# Patient Record
Sex: FEMALE | URBAN - METROPOLITAN AREA
[De-identification: names, ages, dates, MRNs, and addresses within clinical notes are randomized per-mention and may not be internally consistent; named-entity substitution may affect disease eponyms.]

---

## 2017-11-07 ENCOUNTER — TRANSFERRED RECORDS (OUTPATIENT)
Dept: HEALTH INFORMATION MANAGEMENT | Facility: CLINIC | Age: 72
End: 2017-11-07

## 2018-01-10 NOTE — TELEPHONE ENCOUNTER
APPT INFO    Date /Time: 1/17/18 12:45PM   Reason for Appt: Osteomyelitis, L tibia   Ref Provider/Clinic: MANOHAR GAINES/ [facility]    Are there internal records? Yes/No?  IF YES, list clinic names: NO   Are there outside records? Yes/No? YES - see below   Patient Contact (Y/N) & Call Details: NO   Action: CareEverywhere records reviewed. See CareEverywhere Tab.       OUTSIDE RECORDS CHECKLIST     CLINIC NAME COMMENTS REC (x) IMG (x)   [facility] OFFICE NOTES: 10/30/17, 9/11/17, 7/12/17, 4/12/17  RADIOLOGY: MR left tib fib 11/7/17, xray left tib fib 10/30/17 x X

## 2018-01-15 ASSESSMENT — ENCOUNTER SYMPTOMS
MYALGIAS: 0
ARTHRALGIAS: 1
STIFFNESS: 0
MUSCLE CRAMPS: 0
BACK PAIN: 0
MUSCLE WEAKNESS: 0
BRUISES/BLEEDS EASILY: 0
SWOLLEN GLANDS: 0
JOINT SWELLING: 1
NECK PAIN: 0

## 2018-01-16 NOTE — TELEPHONE ENCOUNTER
Phone Call:    Who did you talk to? (or) Who did you call? Patient transferred from [facility]    Call Detail/Action: Check if records sent, advised only need images      Phone Call:    Who did you talk to? (or) Who did you call? [facility] [place] film room- Claude    Call Detail/Action: He stated he received a confirmation of images from our film room      Phone Call:    Who did you talk to? (or) Who did you call? Called Raúl    Call Detail/Action: Images are in PACS under name GENO Jaquez Evelyn

## 2018-01-17 ENCOUNTER — PRE VISIT (OUTPATIENT)
Dept: ORTHOPEDICS | Facility: CLINIC | Age: 73
End: 2018-01-17

## 2018-01-17 ENCOUNTER — OFFICE VISIT (OUTPATIENT)
Dept: ORTHOPEDICS | Facility: CLINIC | Age: 73
End: 2018-01-17
Payer: COMMERCIAL

## 2018-01-17 VITALS — WEIGHT: 214.9 LBS | BODY MASS INDEX: 38.08 KG/M2 | HEIGHT: 63 IN

## 2018-01-17 DIAGNOSIS — M86.60 CHRONIC OSTEOMYELITIS (H): Primary | ICD-10-CM

## 2018-01-17 DIAGNOSIS — M86.9 OSTEOMYELITIS (H): Primary | ICD-10-CM

## 2018-01-17 DIAGNOSIS — M86.60 CHRONIC OSTEOMYELITIS (H): ICD-10-CM

## 2018-01-17 RX ORDER — CITALOPRAM HYDROBROMIDE 20 MG/1
10 TABLET ORAL
COMMUNITY
Start: 2017-12-18 | End: 2018-01-26

## 2018-01-17 RX ORDER — FUROSEMIDE 20 MG
20-40 TABLET ORAL
COMMUNITY
Start: 2017-12-18

## 2018-01-17 RX ORDER — CEPHALEXIN 500 MG/1
500 CAPSULE ORAL
COMMUNITY
Start: 2016-07-13 | End: 2018-01-17

## 2018-01-17 RX ORDER — SIMVASTATIN 10 MG
10 TABLET ORAL
COMMUNITY
Start: 2016-12-12

## 2018-01-17 RX ORDER — SILVER SULFADIAZINE 10 MG/G
0.5 CREAM TOPICAL
COMMUNITY
Start: 2017-09-11 | End: 2018-01-26

## 2018-01-17 RX ORDER — NAPROXEN SODIUM 550 MG/1
550 TABLET ORAL
COMMUNITY
Start: 2017-12-18 | End: 2018-12-18
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
## 2018-01-17 NOTE — MR AVS SNAPSHOT
After Visit Summary   1/17/2018    GENO Jaquez    MRN: [id number]           Patient Information     Date Of Birth          1945        Visit Information        Provider Department      1/17/2018 12:45 PM Fredis Mckeon MD [facility]         Follow-ups after your visit        Your next 10 appointments already scheduled     Jan 26, 2018  2:20 PM CST   CT TIBIA FIBULA LEFT W/O CONTRAST with UCCT1   [facility] ([facility])    [address] [place] [address] [phone]           Please bring any scans or X-rays taken at other hospitals, if similar tests were done. Also bring a list of your medicines, including vitamins, minerals and over-the-counter drugs. It is safest to leave personal items at home.  Be sure to tell your doctor:   If you have any allergies.   If there s any chance you are pregnant.   If you are breastfeeding.   If you have any special needs.  You do not need to do anything special to prepare.  Please wear loose clothing, such as a sweat suit or jogging clothes. Avoid snaps, zippers and other metal. We may ask you to undress and put on a hospital gown.            Jan 26, 2018  3:15 PM CST   (Arrive by 3:00 PM)   RETURN TUMOR VISIT with Fredis Mckeon MD   [facility] ([facility])    [address] [place] [address] [phone]              Future tests that were ordered for you today     Open Future Orders        Priority Expected Expires Ordered    CT Tibia Fibula Left w/o Contrast Routine  1/17/2019 1/17/2018            Who to contact     Please call your clinic at [phone] to:    Ask questions about your health    Make or cancel appointments    Discuss your medicines    Learn about your test results    Speak to your doctor   If you have compliments or concerns about an experience at your clinic,  "or if you wish to file a complaint, please contact [facility] Patient Relations at [phone] or email us at [email or web address]         Additional Information About Your Visit        [email or web address] Information     Gudog gives you secure access to your electronic health record. If you see a primary care provider, you can also send messages to your care team and make appointments. If you have questions, please call your primary care clinic.  If you do not have a primary care provider, please call [phone] and they will assist you.      Gudog is an electronic gateway that provides easy, online access to your medical records. With [employer], you can request a clinic appointment, read your test results, renew a prescription or communicate with your care team.     To access your existing account, please contact your [facility] or call [phone] for assistance.        Care EveryWhere ID     This is your Care EveryWhere ID. This could be used by other organizations to access your [facility] medical records  [id number]        Your Vitals Were     Height BMI (Body Mass Index)                1.607 m (5' 3.25\") 37.77 kg/m2           Blood Pressure from Last 3 Encounters:   No data found for BP    Weight from Last 3 Encounters:   01/17/18 97.5 kg (214 lb 14.4 oz)              Today, you had the following     No orders found for display       Primary Care Provider Office Phone # Fax #    Marge Canada [phone]       [facility] [address] [place] [address]        Equal Access to Services     VIKA TOMPKINS AH: Hadii aad ku hadasho Soomaali, waaxda luqadaha, qaybta kaalmada adeegyada, heena hurst. So [facility] [phone].    ATENCIÓN: Si habla español, tiene a miller disposición servicios gratuitos de asistencia lingüística. Llame al [phone].    We comply with applicable federal civil rights " laws and [place] laws. We do not discriminate on the basis of race, color, national origin, age, disability, sex, sexual orientation, or gender identity.            Thank you!     Thank you for choosing [facility]  for your care. Our goal is always to provide you with excellent care. Hearing back from our patients is one way we can continue to improve our services. Please take a few minutes to complete the written survey that you may receive in the mail after your visit with us. Thank you!             Your Updated Medication List - Protect others around you: Learn how to safely use, store and throw away your medicines at www.disposemymeds.org.          This list is accurate as of: 1/17/18  1:30 PM.  Always use your most recent med list.                   Brand Name Dispense Instructions for use Diagnosis    aspirin 81 MG EC tablet      Take 162 mg by mouth        cholecalciferol 1000 UNIT tablet    vitamin D3     Take 1,000 Units by mouth        citalopram 20 MG tablet    celeXA     Take 10 mg by mouth        furosemide 20 MG tablet    LASIX     Take 20-40 mg by mouth        MULTIVITAMIN ADULT PO           naproxen sodium 550 MG tablet    ANAPROX     Take 550 mg by mouth        silver sulfADIAZINE 1 % cream    SILVADENE     Apply 0.5 g topically        simvastatin 10 MG tablet    ZOCOR     Take 10 mg by mouth

## 2018-01-17 NOTE — NURSING NOTE
"Reason For Visit:   Chief Complaint   Patient presents with     Consult     Pt. states that she is here today for Left Tibia Osteomyelitis. Referring: Erickson Ceja.        Pain Assessment  Patient Currently in Pain: No               HEIGHT: 5' 3.25\", WEIGHT: 0 lbs 0 oz, BMI: There is no height or weight on file to calculate BMI.      Current Outpatient Prescriptions   Medication Sig Dispense Refill     aspirin 81 MG EC tablet Take 162 mg by mouth       cholecalciferol (VITAMIN D3) 1000 UNIT tablet Take 1,000 Units by mouth       citalopram (CELEXA) 20 MG tablet Take 10 mg by mouth       furosemide (LASIX) 20 MG tablet Take 20-40 mg by mouth       Multiple Vitamins-Minerals (MULTIVITAMIN ADULT PO)        naproxen sodium (ANAPROX) 550 MG tablet Take 550 mg by mouth       silver sulfADIAZINE (SILVADENE) 1 % cream Apply 0.5 g topically       simvastatin (ZOCOR) 10 MG tablet Take 10 mg by mouth            Allergies   Allergen Reactions     Diphenhydramine      PN: LW Reaction: RASH     Lanolin      PN: LW Reaction: RASH     Latex      PN: Converted from LW Latex Sensitivity Flag     No Clinical Screening - See Comments      PN: LW Reaction: RASH.  PAPER TAPE OK PER PT.     Prochlorperazine      PN: LW Reaction: LOSS OF MUSCLE CONTROL           "

## 2018-01-26 ENCOUNTER — RADIANT APPOINTMENT (OUTPATIENT)
Dept: CT IMAGING | Facility: CLINIC | Age: 73
End: 2018-01-26
Attending: ORTHOPAEDIC SURGERY
Payer: COMMERCIAL

## 2018-01-26 ENCOUNTER — OFFICE VISIT (OUTPATIENT)
Dept: ORTHOPEDICS | Facility: CLINIC | Age: 73
End: 2018-01-26
Payer: COMMERCIAL

## 2018-01-26 VITALS — BODY MASS INDEX: 38.22 KG/M2 | HEIGHT: 63 IN | WEIGHT: 215.7 LBS

## 2018-01-26 DIAGNOSIS — S91.002D OPEN WOUND OF LEFT KNEE, LEG, AND ANKLE, SUBSEQUENT ENCOUNTER: Primary | ICD-10-CM

## 2018-01-26 DIAGNOSIS — S81.002D OPEN WOUND OF LEFT KNEE, LEG, AND ANKLE, SUBSEQUENT ENCOUNTER: Primary | ICD-10-CM

## 2018-01-26 DIAGNOSIS — S81.802D OPEN WOUND OF LEFT KNEE, LEG, AND ANKLE, SUBSEQUENT ENCOUNTER: Primary | ICD-10-CM

## 2018-01-26 DIAGNOSIS — M86.60 CHRONIC OSTEOMYELITIS (H): ICD-10-CM

## 2018-01-26 LAB
BACTERIA SPEC CULT: NORMAL
Lab: NORMAL
SPECIMEN SOURCE: NORMAL
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
## 2018-01-26 NOTE — MR AVS SNAPSHOT
"              After Visit Summary   1/26/2018    Rita Jaquez    MRN: [id number]           Patient Information     Date Of Birth          1945        Visit Information        Provider Department      1/26/2018 3:15 PM Fredis Mckeon MD [facility]        Today's Diagnoses     Open wound of left knee, leg, and ankle, subsequent encounter    -  1       Follow-ups after your visit        Who to contact     Please call your clinic at [phone] to:    Ask questions about your health    Make or cancel appointments    Discuss your medicines    Learn about your test results    Speak to your doctor   If you have compliments or concerns about an experience at your clinic, or if you wish to file a complaint, please contact [facility] Patient Relations at [phone] or email us at [email or web address]         Additional Information About Your Visit        MyChart Information     [employer] gives you secure access to your electronic health record. If you see a primary care provider, you can also send messages to your care team and make appointments. If you have questions, please call your primary care clinic.  If you do not have a primary care provider, please call [phone] and they will assist you.      [employer] is an electronic gateway that provides easy, online access to your medical records. With [employer], you can request a clinic appointment, read your test results, renew a prescription or communicate with your care team.     To access your existing account, please contact your [facility] or call [phone] for assistance.        Care EveryWhere ID     This is your Care EveryWhere ID. This could be used by other organizations to access your [facility] medical records  [id number]        Your Vitals Were     Height BMI (Body Mass Index)                1.607 m (5' 3.25\") 37.91 kg/m2           Blood Pressure from " Last 3 Encounters:   No data found for BP    Weight from Last 3 Encounters:   01/26/18 97.8 kg (215 lb 11.2 oz)   01/17/18 97.5 kg (214 lb 14.4 oz)              We Performed the Following     Anaerobic bacterial culture     Skin Culture Aerobic Bacterial     UNNA BOOT APPLICATION     Wound Culture Aerobic Bacterial        Primary Care Provider Office Phone # Fax #    Marge Canada [phone]       [facility] [address] [place] [address]        Equal Access to Services     VIKA TOMPKINS : Hadii aad ku hadasho Soomaali, waaxda luqadaha, qaybta kaalmada adeegyada, waxay idiin hayaan adepenny kharaerick goodrich . So [facility] [phone].    ATENCIÓN: Si habla español, tiene a miller disposición servicios gratuitos de asistencia lingüística. [facility] [phone].    We comply with applicable federal civil rights laws and [place] laws. We do not discriminate on the basis of race, color, national origin, age, disability, sex, sexual orientation, or gender identity.            Thank you!     Thank you for choosing [facility]  for your care. Our goal is always to provide you with excellent care. Hearing back from our patients is one way we can continue to improve our services. Please take a few minutes to complete the written survey that you may receive in the mail after your visit with us. Thank you!             Your Updated Medication List - Protect others around you: Learn how to safely use, store and throw away your medicines at www.disposemymeds.org.          This list is accurate as of 1/26/18 11:59 PM.  Always use your most recent med list.                   Brand Name Dispense Instructions for use Diagnosis    aspirin 81 MG EC tablet      Take 162 mg by mouth        cholecalciferol 1000 UNIT tablet    vitamin D3     Take 1,000 Units by mouth        furosemide 20 MG tablet    LASIX     Take 20-40 mg by mouth        MULTIVITAMIN ADULT PO           naproxen sodium 550 MG  tablet    ANAPROX     Take 550 mg by mouth        simvastatin 10 MG tablet    ZOCOR     Take 10 mg by mouth

## 2018-01-26 NOTE — NURSING NOTE
"Reason For Visit:   Chief Complaint   Patient presents with     RECHECK     Pt. states that she is here today for Left Tibia Osteomyelitis and review CT Scan. . Same Day CT Scan.                        HEIGHT: 5' 3.25\", WEIGHT: 215 lbs 11.2 oz, BMI: Body mass index is 37.91 kg/(m^2).      Current Outpatient Prescriptions   Medication Sig Dispense Refill     aspirin 81 MG EC tablet Take 162 mg by mouth       cholecalciferol (VITAMIN D3) 1000 UNIT tablet Take 1,000 Units by mouth       furosemide (LASIX) 20 MG tablet Take 20-40 mg by mouth       Multiple Vitamins-Minerals (MULTIVITAMIN ADULT PO)        naproxen sodium (ANAPROX) 550 MG tablet Take 550 mg by mouth       simvastatin (ZOCOR) 10 MG tablet Take 10 mg by mouth            Allergies   Allergen Reactions     Diphenhydramine      PN: LW Reaction: RASH     Lanolin      PN: LW Reaction: RASH     Latex      PN: Converted from LW Latex Sensitivity Flag     No Clinical Screening - See Comments      PN: LW Reaction: RASH.  PAPER TAPE OK PER PT.     Prochlorperazine      PN: LW Reaction: LOSS OF MUSCLE CONTROL       "
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
## 2018-01-26 NOTE — LETTER
1/26/2018       RE: Rita Jaquez  [address] [place] [address]     Dear Colleague,    Thank you for referring your patient, Rita Jaquez, to the [facility] at [facility]. Please see a copy of my visit note below.    CHIEF COMPLAINT:  Left medial lower leg wound and history of left tibia chronic osteomyelitis.      HISTORY:  Britany, as she likes to be referred to, is a 72-year-old female who is here for followup and discussion of her recent left tibia CT to evaluate for chronic osteomyelitis.  Britany has a history of a traumatic lower leg injury when she was a young woman that resulted in multiple surgeries and chronic osteomyelitis.  She has been hoping to have a knee replacement for her severe left knee osteoarthritis, but there are some concerns about recurrent osteomyelitis because of a left lower leg medial wound that has been draining a small amount of serous fluid.  She noticed a smaller wound that developed from a skin irritation last week has now progressed.  She has been covering the wound with gauze.  She denies any fever or chills.  She reports she has chronic issues with swelling in the left leg.  She has tried compression stockings in the past but with limited results.  She states that if her leg is not measured immediately when she wakes up, the swelling becomes significant after only an hour or two of being on the leg.  This causes an ill-fitting compression stocking.  She is trying to stay active, walking twice a week and doing yoga twice a week.  [place]  She otherwise complains of no pain in the lower leg, only knee pain with activity.  No other concerns.  She is here to discuss her CT results with Dr. Mckeon.      PHYSICAL EXAMINATION:  Britany is a pleasant 72-year-old female who is alert and oriented, in no apparent distress.  She has a mildly antalgic wide-based gait without gait  assistance today.  She has significant scarring of the left lower extremity with previous areas of skin grafting.  The mid tibia skin graft, there is depression in the skin and soft tissue where there are some venous stasis changes of the skin.  There is a superficial ulceration approximately the size of a quarter, with a new ulceration approximately the size of a dime just posterior to that.  There is mild serous drainage.  There is no significant odor.  There is no tunneling.  There is no significant tenderness to the left lower extremity.  There is +2 to +3 edema in different areas of the leg.  There is minimal edema of the foot.      IMAGING:  CT scan of the left lower extremity was ordered and obtained today.  This shows severe tricompartmental osteoarthritis of the left knee.  This shows chronic bony deformity of the tibial shaft.  There is no obvious area of sequestrum.  There are irregular dense areas of sclerosis throughout the tibia.  There is no obvious sinus tract.  There is a small calcification in the skin on the medial lower leg skin surface.  This may correspond to the area of the wound.  These images were reviewed with the patient.      IMPRESSION:   1.  72-year-old female with a history of left lower leg trauma and history of chronic osteomyelitis with no obvious sign of active osteomyelitis.   2.  Chronic lymphedema, left lower extremity.   3.  Left medial lower leg ulceration, likely venous stasis changes.      PLAN:  Britany can continue to participate in activities as tolerated.  We are going to try her with an Unna boot, wrapping of the lower leg wound.  I placed the Unna boot, followed by Kerlix gauze on the left lower leg today.  She is to leave that intact for 4-5 days and then place another dressing.  We will try this for 2 weeks and see if her wound is improving.  I think she would still benefit from a thigh-high compression stocking.  I have concerns that after a total knee replacement,  that she will have significant increase in her edema and swelling which may aggravate this medial lower leg area again.  She may be a good candidate for lymphedema treatment down the road.  We would recommend that she proceed with her total knee arthroplasty as we see no active osteomyelitis at this time.  Britany will meet with Dr. Cabrera regarding her knee and talk about knee replacement.  She can also get more Unna boot dressing from her primary care physician, Dr. Canada, if needed, or she can receive it from us.  She can also go to the [facility] if she desires.  We feel that this wound represents venous stasis changes, likely related to the poor circulation in her leg and the lymphedema from the chronic swelling and numerous surgeries in this area.  This does not seem to represent an infection related to a draining sinus tract from an osteomyelitis.  We did culture the larger wound today just to ensure she does not have any MRSA.  If so, we would recommend she be decolonized before her total knee arthroplasty.  We will call her with results.  She agrees with the plan of care.  All questions were answered.  Dr. Mckeon examined the patient as well and agrees with the plan of care.       I was present with the PA during the history and exam.  I discussed the case with the PA and agree with the findings as documented in the assessment and plan.    Again, thank you for allowing me to participate in the care of your patient.      Sincerely,    Fredis Mckeon MD    cc: Marge Canada MD         [facility]        Erickson Ceja MD        [facility]

## 2018-01-28 LAB
BACTERIA SPEC CULT: ABNORMAL
BACTERIA SPEC CULT: ABNORMAL
Lab: ABNORMAL
SPECIMEN SOURCE: ABNORMAL

## 2018-02-02 ENCOUNTER — TELEPHONE (OUTPATIENT)
Dept: ORTHOPEDICS | Facility: CLINIC | Age: 73
End: 2018-02-02

## 2018-02-02 DIAGNOSIS — S91.002A OPEN WOUND OF KNEE, LEG, AND ANKLE, LEFT, INITIAL ENCOUNTER: Primary | ICD-10-CM

## 2018-02-02 DIAGNOSIS — S81.802A OPEN WOUND OF KNEE, LEG, AND ANKLE, LEFT, INITIAL ENCOUNTER: Primary | ICD-10-CM

## 2018-02-02 DIAGNOSIS — S81.002A OPEN WOUND OF KNEE, LEG, AND ANKLE, LEFT, INITIAL ENCOUNTER: Primary | ICD-10-CM

## 2018-02-02 RX ORDER — SULFAMETHOXAZOLE/TRIMETHOPRIM 800-160 MG
1 TABLET ORAL 2 TIMES DAILY
Qty: 20 TABLET | Refills: 0
Start: 2018-02-02

## 2018-02-02 NOTE — TELEPHONE ENCOUNTER
RN called to [facility] in [employer]  Bactrim DS- Take 1 tablet by mouth 2 times daily, Disp-20 tablet, R-0, JADEN/Dr. Mckeon.

## 2018-02-04 LAB
BACTERIA SPEC CULT: ABNORMAL
Lab: ABNORMAL
SPECIMEN SOURCE: ABNORMAL

## 2019-09-30 ENCOUNTER — HEALTH MAINTENANCE LETTER (OUTPATIENT)
Age: 74
End: 2019-09-30

## 2020-03-15 ENCOUNTER — HEALTH MAINTENANCE LETTER (OUTPATIENT)
Age: 75
End: 2020-03-15

## 2021-01-15 ENCOUNTER — HEALTH MAINTENANCE LETTER (OUTPATIENT)
Age: 76
End: 2021-01-15

## 2021-05-09 ENCOUNTER — HEALTH MAINTENANCE LETTER (OUTPATIENT)
Age: 76
End: 2021-05-09

## 2021-10-24 ENCOUNTER — HEALTH MAINTENANCE LETTER (OUTPATIENT)
Age: 76
End: 2021-10-24

## 2022-06-05 ENCOUNTER — HEALTH MAINTENANCE LETTER (OUTPATIENT)
Age: 77
End: 2022-06-05

## 2022-10-15 ENCOUNTER — HEALTH MAINTENANCE LETTER (OUTPATIENT)
Age: 77
End: 2022-10-15

## 2023-06-11 ENCOUNTER — HEALTH MAINTENANCE LETTER (OUTPATIENT)
Age: 78
End: 2023-06-11

## 2023-10-29 ENCOUNTER — HEALTH MAINTENANCE LETTER (OUTPATIENT)
Age: 78
End: 2023-10-29